# Patient Record
Sex: FEMALE | Race: AMERICAN INDIAN OR ALASKA NATIVE
[De-identification: names, ages, dates, MRNs, and addresses within clinical notes are randomized per-mention and may not be internally consistent; named-entity substitution may affect disease eponyms.]

---

## 2018-01-14 ENCOUNTER — HOSPITAL ENCOUNTER (EMERGENCY)
Dept: HOSPITAL 5 - ED | Age: 23
LOS: 1 days | Discharge: LEFT BEFORE DISCHARGE | End: 2018-01-15
Payer: SELF-PAY

## 2018-01-14 VITALS — DIASTOLIC BLOOD PRESSURE: 83 MMHG | SYSTOLIC BLOOD PRESSURE: 132 MMHG

## 2018-01-14 DIAGNOSIS — Z53.21: Primary | ICD-10-CM

## 2020-08-30 ENCOUNTER — HOSPITAL ENCOUNTER (EMERGENCY)
Dept: HOSPITAL 5 - ED | Age: 25
Discharge: HOME | End: 2020-08-30
Payer: SELF-PAY

## 2020-08-30 VITALS — DIASTOLIC BLOOD PRESSURE: 78 MMHG | SYSTOLIC BLOOD PRESSURE: 106 MMHG
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
## 2020-08-30 DIAGNOSIS — S61.300A: Primary | ICD-10-CM
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
## 2020-08-30 PROCEDURE — 99282 EMERGENCY DEPT VISIT SF MDM: CPT

## 2020-08-30 NOTE — EMERGENCY DEPARTMENT REPORT
ED General Adult HPI





- General


Chief complaint: Extremity Injury, Upper


Stated complaint: INJURY TO RIGHT MIDDLE FINGER


Time Seen by Provider: 08/30/20 06:36


Source: patient


Mode of arrival: Ambulatory


Limitations: No Limitations





- History of Present Illness


Initial comments: 





[age]-year-old -American female presents emergency department after acci

dental slip and fall and striking her right middle finger CAUSING HER ACRYLIC 

FINGER NAIL TO LIFT  OFF OF THE NAIL BED RESULTING IN A THROBBING PAIN WITH 

SCANT BLEEDING. 


-: Sudden


Location: upper extremity


Radiation: non-radiation


Severity scale (0 -10): 10


Consistency: constant


Improves with: none


Worsens with: movement





- Related Data


                                    Allergies











Allergy/AdvReac Type Severity Reaction Status Date / Time


 


No Known Allergies Allergy   Verified 01/14/18 17:53














ED Review of Systems


ROS: 


Stated complaint: INJURY TO RIGHT MIDDLE FINGER


Other details as noted in HPI





Comment: All other systems reviewed and negative





ED Past Medical Hx





- Past Medical History


Previous Medical History?: No





- Surgical History


Past Surgical History?: No


Additional Surgical History: wisdom teeth





- Social History


Smoking Status: Never Smoker


Substance Use Type: Marijuana





ED Physical Exam





- General


Limitations: No Limitations





- Expanded Upper Extremity Exam


  ** Right


Hand Wrist exam: Present: tenderness


Hand L/R Back: 


                            __________________________














                            __________________________





 1 - Partially avulsed fingernail at the matrix level.  No laceration








ED Course





                                   Vital Signs











  08/30/20





  05:37


 


Temperature 98 F


 


Pulse Rate 72


 


Respiratory 18





Rate 


 


Blood Pressure 106/78


 


O2 Sat by Pulse 100





Oximetry 














- Procedure Description


Procedures done: AREA WAS PREP AND DRAPED IN ASEPTIC FASHION. ANSTEHESIA WITH 2%

LIDOCAINE WITHOUT EPI. fingernail was returned to the matrix and taped down with

a finger splint and Coban


Critical care attestation.: 


If time is entered above; I have spent that time in minutes in the direct care 

of this critically ill patient, excluding procedure time.








ED Disposition


Clinical Impression: 


 Fingernail avulsion, partial





Disposition: DC-01 TO HOME OR SELFCARE


Is pt being admited?: No


Does the pt Need Aspirin: No


Condition: Stable


Instructions:  Toenail/Fingernail Removal (ED)


Additional Instructions: 


PLEASE DO NOT REMOVE YOUR FINGERNAIL ALLOW IT TO GROW OR SPONTANEOUSLY SEPARATE 

FROM THE MATIRX


Referrals: 


PRIMARY CARE,MD [Primary Care Provider] - 3-5 Days

## 2021-02-22 ENCOUNTER — HOSPITAL ENCOUNTER (EMERGENCY)
Dept: HOSPITAL 5 - ED | Age: 26
Discharge: HOME | End: 2021-02-22
Payer: SELF-PAY

## 2021-02-22 VITALS — SYSTOLIC BLOOD PRESSURE: 112 MMHG | DIASTOLIC BLOOD PRESSURE: 69 MMHG

## 2021-02-22 DIAGNOSIS — Z79.899: ICD-10-CM

## 2021-02-22 DIAGNOSIS — A08.4: Primary | ICD-10-CM

## 2021-02-22 DIAGNOSIS — F12.10: ICD-10-CM

## 2021-02-22 LAB
ALBUMIN SERPL-MCNC: 4.3 G/DL (ref 3.9–5)
ALT SERPL-CCNC: 13 UNITS/L (ref 7–56)
BASOPHILS # (AUTO): 0 K/MM3 (ref 0–0.1)
BASOPHILS NFR BLD AUTO: 0.5 % (ref 0–1.8)
BILIRUB UR QL STRIP: (no result)
BLOOD UR QL VISUAL: (no result)
BUN SERPL-MCNC: 9 MG/DL (ref 7–17)
BUN/CREAT SERPL: 13 %
CALCIUM SERPL-MCNC: 8.9 MG/DL (ref 8.4–10.2)
EOSINOPHIL # BLD AUTO: 0.1 K/MM3 (ref 0–0.4)
EOSINOPHIL NFR BLD AUTO: 1 % (ref 0–4.3)
HCT VFR BLD CALC: 40.2 % (ref 30.3–42.9)
HEMOLYSIS INDEX: 2
HGB BLD-MCNC: 13.2 GM/DL (ref 10.1–14.3)
LYMPHOCYTES # BLD AUTO: 1.5 K/MM3 (ref 1.2–5.4)
LYMPHOCYTES NFR BLD AUTO: 25.7 % (ref 13.4–35)
MCHC RBC AUTO-ENTMCNC: 33 % (ref 30–34)
MCV RBC AUTO: 90 FL (ref 79–97)
MONOCYTES # (AUTO): 0.3 K/MM3 (ref 0–0.8)
MONOCYTES % (AUTO): 5.5 % (ref 0–7.3)
MUCOUS THREADS #/AREA URNS HPF: (no result) /HPF
PH UR STRIP: 7 [PH] (ref 5–7)
PLATELET # BLD: 232 K/MM3 (ref 140–440)
PROT UR STRIP-MCNC: (no result) MG/DL
RBC # BLD AUTO: 4.47 M/MM3 (ref 3.65–5.03)
RBC #/AREA URNS HPF: 4 /HPF (ref 0–6)
UROBILINOGEN UR-MCNC: < 2 MG/DL (ref ?–2)
WBC #/AREA URNS HPF: < 1 /HPF (ref 0–6)

## 2021-02-22 PROCEDURE — 85025 COMPLETE CBC W/AUTO DIFF WBC: CPT

## 2021-02-22 PROCEDURE — 81001 URINALYSIS AUTO W/SCOPE: CPT

## 2021-02-22 PROCEDURE — 36415 COLL VENOUS BLD VENIPUNCTURE: CPT

## 2021-02-22 PROCEDURE — 81025 URINE PREGNANCY TEST: CPT

## 2021-02-22 PROCEDURE — 80053 COMPREHEN METABOLIC PANEL: CPT

## 2021-02-22 PROCEDURE — 99283 EMERGENCY DEPT VISIT LOW MDM: CPT

## 2021-02-22 NOTE — EMERGENCY DEPARTMENT REPORT
ED N/V/D HPI





- General


Chief complaint: Nausea/Vomiting/Diarrhea


Stated complaint: HEAD PAIN/VOMITING


Source: patient


Mode of arrival: Ambulatory


Limitations: No Limitations





- History of Present Illness


Initial comments: 





Patient is a nulliparous 25-year-old -American female with no past 

medical history presents to the ED with complaint of acute onset persistent 

nausea and vomiting, diarrhea and mild diffuse abdominal pain for the last 12 

hours.  Patient states that the last meal she ate was about 24 hours ago at a 

restaurant and that it consisted of seafood.  Patient states that she has not 

been able to keep anything down especially in the last 12 hours and that she has

had multiple nausea and vomiting episode with diarrhea.  Patient denies fever, 

chills, dizziness, syncope, chest pain, shortness of breath, dysuria, urinary 

frequency and urgency, vaginal bleeding, vaginal discharge, sore throat, 

headache or cough.


MD complaint: nausea, vomiting, diarrhea


-: Sudden, hour(s) (12)


Description of Vomiting: food contents, watery, bilious


Description of Diarrhea: water


Associated Abdominal Pain: Yes (mild suprapubic cramps)


Location: diffuse


Radiation: none


Severity: mild


Pain Scale: 2


Quality: cramping, aching, dull


Consistency: intermittent


Improves with: none


Worsens with: eating, vomiting


Context: other (possibly pregnant)


Associated Symptoms: denies other symptoms, loss of appetite, malaise, 

nausea/vomiting.  denies: myalgias, chest pain, cough, diaphoresis, 

fever/chills, headaches, rash, dysuria, shortness of breath, syncope, weakness, 

other





- Related Data


                                  Previous Rx's











 Medication  Instructions  Recorded  Last Taken  Type


 


Dicyclomine [Bentyl] 20 mg PO Q6H PRN #20 tablet 02/22/21 Unknown Rx


 


Famotidine [Pepcid] 20 mg PO BID #30 tablet 02/22/21 Unknown Rx


 


Ondansetron [Zofran Odt] 4 mg PO Q6HR PRN #20 tab.rapdis 02/22/21 Unknown Rx











                                    Allergies











Allergy/AdvReac Type Severity Reaction Status Date / Time


 


No Known Allergies Allergy   Verified 01/14/18 17:53














ED Review of Systems


ROS: 


Stated complaint: HEAD PAIN/VOMITING


Other details as noted in HPI





Constitutional: denies: chills, fever


Eyes: denies: eye pain, eye discharge, vision change


ENT: denies: ear pain, throat pain


Respiratory: denies: cough, shortness of breath, wheezing


Cardiovascular: denies: chest pain, palpitations


Endocrine: no symptoms reported


Gastrointestinal: nausea, vomiting, diarrhea.  denies: abdominal pain


Genitourinary: denies: urgency, dysuria, discharge


Musculoskeletal: denies: back pain, joint swelling, arthralgia


Skin: denies: rash, lesions


Neurological: denies: headache, weakness, paresthesias


Psychiatric: denies: anxiety, depression


Hematological/Lymphatic: denies: easy bleeding, easy bruising





ED Past Medical Hx





- Surgical History


Additional Surgical History: wisdom teeth





- Social History


Smoking Status: Never Smoker


Substance Use Type: Marijuana





- Medications


Home Medications: 


                                Home Medications











 Medication  Instructions  Recorded  Confirmed  Last Taken  Type


 


Dicyclomine [Bentyl] 20 mg PO Q6H PRN #20 tablet 02/22/21  Unknown Rx


 


Famotidine [Pepcid] 20 mg PO BID #30 tablet 02/22/21  Unknown Rx


 


Ondansetron [Zofran Odt] 4 mg PO Q6HR PRN #20 tab.rapdis 02/22/21  Unknown Rx














ED Physical Exam





- General


Limitations: No Limitations


General appearance: alert, in no apparent distress





- Head


Head exam: Present: atraumatic, normocephalic, normal inspection





- Eye


Eye exam: Present: normal appearance, PERRL, EOMI


Pupils: Present: normal accommodation





- ENT


ENT exam: Present: normal exam, normal orophraynx, mucous membranes moist, TM's 

normal bilaterally, normal external ear exam





- Neck


Neck exam: Present: normal inspection, full ROM





- Respiratory


Respiratory exam: Present: normal lung sounds bilaterally.  Absent: respiratory 

distress, wheezes, rales, stridor, chest wall tenderness, decreased breath 

sounds, prolonged expiratory





- Cardiovascular


Cardiovascular Exam: Present: regular rate, normal rhythm, normal heart sounds. 

Absent: systolic murmur, diastolic murmur, rubs, gallop





- GI/Abdominal


GI/Abdominal exam: Present: soft, normal bowel sounds.  Absent: tenderness, 

guarding, rebound, hyperactive bowel sounds, hypoactive bowel sounds





- Extremities Exam


Extremities exam: Present: normal inspection, full ROM, normal capillary refill





- Back Exam


Back exam: Present: normal inspection, full ROM.  Absent: tenderness, CVA 

tenderness (R), CVA tenderness (L), muscle spasm, paraspinal tenderness, 

vertebral tenderness





- Neurological Exam


Neurological exam: Present: alert, oriented X3, CN II-XII intact, normal gait, 

reflexes normal





- Psychiatric


Psychiatric exam: Present: normal affect, normal mood





- Skin


Skin exam: Present: warm, dry, intact, normal color.  Absent: rash





ED Course


                                   Vital Signs











  02/22/21





  19:22


 


Temperature 97.8 F


 


Pulse Rate 63


 


Respiratory 18





Rate 


 


Blood Pressure 112/69





[Right] 


 


O2 Sat by Pulse 100





Oximetry 














ED Medical Decision Making





- Lab Data


Result diagrams: 


                                 02/22/21 19:37





                                 02/22/21 19:37





- Medical Decision Making





This is a nulliparous 25-year-old -American female with no past medical 

history presents to the ED with complaint of acute onset persistent nausea and 

vomiting, diarrhea and mild diffuse abdominal pain for the last 12 hours.  

Patient states that the last meal she ate was about 24 hours ago at a restaurant

 and that it consisted of seafood.  Patient states that she has not been able to

 keep anything down especially in the last 12 hours and that she has had 

multiple nausea and vomiting episode with diarrhea.  In the ED, patient is alert

 and oriented x3 and is not in distress.  Lab test results were reviewed and are

 all nonactionable.  Patient was treated for nausea and vomiting in the ED and 

also given antacids.  On reevaluation, patient has not had any nausea or 

vomiting while in the ED and passed oral fluid challenge in the ED.  Patient s

ymptoms are likely due to viral gastroenteritis causing the symptoms form food 

poisoning.  Patient was therefore discharged home on antiemetics, antacids and 

pain medications and was advised to maintain a clear liquid diet for 1224 hrs. 

while taking medications, drink plenty of fluids and follow-up with her primary 

care physician in 5 to 7 days for reevaluation.  Patient was also advised to 

return to the ED immediately if symptoms get worse.





- Differential Diagnosis


Viral gastroenteritis, dehydration, UTI, GERD, gastritis


Critical care attestation.: 


If time is entered above; I have spent that time in minutes in the direct care 

of this critically ill patient, excluding procedure time.








ED Disposition


Clinical Impression: 


 Nausea, vomiting and diarrhea, Viral gastroenteritis





Disposition: DC-01 TO HOME OR SELFCARE


Is pt being admited?: No


Does the pt Need Aspirin: No


Condition: Stable


Instructions:  Viral Gastroenteritis, Adult, Easy-to-Read, Nausea and Vomiting, 

Adult, Easy-to-Read


Additional Instructions: 


All lab test results were reviewed and are all nonactionable.  Symptoms are 

likely due to a viral gastroenteritis from food poisoning.  Therefore maintain a

 clear liquid diet for 12 to 24 hours, drink plenty of fluids, take medications 

as advised and follow-up with your primary care physician in 5 to 7 days for 

reevaluation.  Return to the ED immediately if symptoms get worse.


Prescriptions: 


Dicyclomine [Bentyl] 20 mg PO Q6H PRN #20 tablet


 PRN Reason: abdominal pain


Famotidine [Pepcid] 20 mg PO BID #30 tablet


Ondansetron [Zofran Odt] 4 mg PO Q6HR PRN #20 tab.rapdis


 PRN Reason: Nausea


Referrals: 


PRIMARY CARE,MD [Primary Care Provider] - 3-5 Days


[facility] [Provider Group] - 3-5 Days


Time of Disposition: 22:58


Print Language: ENGLISH